# Patient Record
Sex: FEMALE | Race: WHITE | NOT HISPANIC OR LATINO | Employment: FULL TIME | URBAN - METROPOLITAN AREA
[De-identification: names, ages, dates, MRNs, and addresses within clinical notes are randomized per-mention and may not be internally consistent; named-entity substitution may affect disease eponyms.]

---

## 2024-05-17 ENCOUNTER — TRANSCRIBE ORDERS (OUTPATIENT)
Dept: ADMINISTRATIVE | Facility: HOSPITAL | Age: 49
End: 2024-05-17
Payer: COMMERCIAL

## 2024-05-17 DIAGNOSIS — Z12.31 OTHER SCREENING MAMMOGRAM: Primary | ICD-10-CM

## 2024-05-28 ENCOUNTER — OFFICE VISIT (OUTPATIENT)
Dept: OBSTETRICS AND GYNECOLOGY | Facility: CLINIC | Age: 49
End: 2024-05-28
Payer: COMMERCIAL

## 2024-05-28 VITALS
HEIGHT: 68 IN | SYSTOLIC BLOOD PRESSURE: 138 MMHG | DIASTOLIC BLOOD PRESSURE: 80 MMHG | BODY MASS INDEX: 24.1 KG/M2 | WEIGHT: 159 LBS

## 2024-05-28 DIAGNOSIS — N95.1 MENOPAUSAL SYMPTOMS: Primary | ICD-10-CM

## 2024-05-28 DIAGNOSIS — N92.6 IRREGULAR PERIODS: ICD-10-CM

## 2024-05-28 DIAGNOSIS — B97.7 HPV IN FEMALE: ICD-10-CM

## 2024-05-28 DIAGNOSIS — R53.83 FATIGUE, UNSPECIFIED TYPE: ICD-10-CM

## 2024-05-28 DIAGNOSIS — Z11.3 SCREENING FOR STD (SEXUALLY TRANSMITTED DISEASE): ICD-10-CM

## 2024-05-28 PROCEDURE — 99203 OFFICE O/P NEW LOW 30 MIN: CPT | Performed by: NURSE PRACTITIONER

## 2024-05-28 RX ORDER — MELATONIN
1000 DAILY
COMMUNITY
Start: 2023-10-01

## 2024-05-28 RX ORDER — CHLORAL HYDRATE 500 MG
1000 CAPSULE ORAL
COMMUNITY

## 2024-05-28 NOTE — PROGRESS NOTES
"Chief Complaint  Nida Meier is a 48 y.o.  female presenting for Gynecologic Exam (New GYN, establish care.  Patient had pap 5/15/24 elsewhere.  Negative, high risk HPV positive, HR HPV 16 positive. /Patient desires consultation only today. )    History of Present Illness  Nida is a very pleasant 49yo woman, , here for establishing gynecologic care.    Her PSHx includes a  section delivery.  (2nd delivery was a .)  She recently had a pap smear, elsewhere, with negative cytology, but + HR HPV.  HPV typing was done, and it was type 16, positive.  (Negative for 18/45.)  She is willing to be scheduled for colposcopy/ECC/Bx of the cervix.  She has no previous PMHx of abnormal paps or HPV.  She has a Mirena IUD for contraception.    Sexual partners in her lifetime, approx seven (7).  She is a fit / active / nonsmoker.      She is having irregular menstrual cycles in the past year.  And she is experiencing VMS / flushing / night sweats and difficulty sleeping.  She c/o having \"brain fog\" sometimes.  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  And notes some vaginal dryness with sexual activity.    Mammogram is scheduled for next month.  Colonoscopy is scheduled for tomorrow.    The following portions of the patient's history were reviewed and updated as appropriate: allergies, current medications, past family history, past medical history, past social history, past surgical history, and problem list.    Allergies   Allergen Reactions    Sulfa Antibiotics Rash         Current Outpatient Medications:     cholecalciferol (Vitamin D) 25 MCG (1000 UT) tablet, Take 1 tablet by mouth Daily., Disp: , Rfl:     Omega-3 Fatty Acids (fish oil) 1000 MG capsule capsule, Take 1 capsule by mouth Daily With Breakfast., Disp: , Rfl:     History reviewed. No pertinent past medical history.     Past " "Surgical History:   Procedure Laterality Date     SECTION         Objective  /80   Ht 172.7 cm (68\")   Wt 72.1 kg (159 lb)   LMP 05/15/2024 (Exact Date) Comment: Mirena IUD.  Breastfeeding No   BMI 24.18 kg/m²     Physical Exam  Vitals and nursing note reviewed.   Constitutional:       General: She is not in acute distress.     Appearance: Normal appearance. She is normal weight. She is not ill-appearing.   Neck:      Thyroid: No thyroid mass or thyromegaly.   Cardiovascular:      Rate and Rhythm: Normal rate and regular rhythm.      Heart sounds: Normal heart sounds. No murmur heard.  Pulmonary:      Effort: Pulmonary effort is normal. No respiratory distress.      Breath sounds: Normal breath sounds.   Abdominal:      General: Abdomen is flat.      Palpations: There is no mass.      Tenderness: There is no abdominal tenderness.   Skin:     General: Skin is warm and dry.   Neurological:      Mental Status: She is alert and oriented to person, place, and time.   Psychiatric:         Mood and Affect: Mood normal.         Behavior: Behavior normal.         Assessment/Plan   Diagnoses and all orders for this visit:    1. Menopausal symptoms (Primary)  -     Follicle Stimulating Hormone; Future  -     Luteinizing Hormone; Future  -     Estradiol; Future  -     TSH; Future    2. Irregular periods  -     CBC & Differential; Future  -     Follicle Stimulating Hormone; Future  -     Luteinizing Hormone; Future  -     Estradiol; Future  -     TSH; Future    3. HPV in female    4. Screening for STD (sexually transmitted disease)  -     HIV-1 / O / 2 Ag / Antibody; Future  -     T Pallidum Antibody w/ reflex RPR; Future  -     Hepatitis C RNA, Quantitative, PCR (graph); Future  -     Hepatitis C Antibody; Future    5. Fatigue, unspecified type  -     TSH; Future    Couns re: Labs, then will manage the VMS / vaginal dryness.  Couns re: Colpo/Bx/ECC procedure, and will schedule back for this in the next few " weeks.      Procedures    40 to 64: Counseling/Anticipatory Guidance Discussed: sexual behavior and STDs, contraception, screenings, and ABN pap smear / HPV and the Colpo procedure.    Return for Colpo/Bx/ECC.    Rosario Rome, APRN  05/28/2024

## 2024-06-03 ENCOUNTER — TELEPHONE (OUTPATIENT)
Dept: OBSTETRICS AND GYNECOLOGY | Facility: CLINIC | Age: 49
End: 2024-06-03
Payer: COMMERCIAL

## 2024-06-03 NOTE — TELEPHONE ENCOUNTER
Caller: Nida Meier    Relationship: Self    Best call back number: [phone]    What orders are you requesting (i.e. lab or imaging): BLOODWORK / LABS    In what timeframe would the patient need to come in: ASAP    Where will you receive your lab/imaging services:  [facility] ON [address]    Additional notes: FAX [phone]  PT IS THERE NOW TO HAVE LABS DRAWN AND NEEDS TO HAVE THIS ORDER SENT ASAP

## 2024-06-12 ENCOUNTER — HOSPITAL ENCOUNTER (OUTPATIENT)
Dept: MAMMOGRAPHY | Facility: HOSPITAL | Age: 49
Discharge: HOME | End: 2024-06-12
Admitting: OBSTETRICS & GYNECOLOGY
Payer: COMMERCIAL

## 2024-06-12 DIAGNOSIS — Z12.31 OTHER SCREENING MAMMOGRAM: ICD-10-CM

## 2024-06-12 PROCEDURE — 77063 BREAST TOMOSYNTHESIS BI: CPT

## 2024-06-12 PROCEDURE — 77067 SCR MAMMO BI INCL CAD: CPT

## 2024-07-03 ENCOUNTER — OFFICE VISIT (OUTPATIENT)
Dept: OBSTETRICS AND GYNECOLOGY | Facility: CLINIC | Age: 49
End: 2024-07-03
Payer: COMMERCIAL

## 2024-07-03 VITALS
SYSTOLIC BLOOD PRESSURE: 128 MMHG | DIASTOLIC BLOOD PRESSURE: 78 MMHG | WEIGHT: 159 LBS | HEIGHT: 68 IN | BODY MASS INDEX: 24.1 KG/M2

## 2024-07-03 DIAGNOSIS — R87.810 HUMAN PAPILLOMAVIRUS (HPV) TYPE 16 DNA DETECTED IN CERVICAL SPECIMEN: Primary | ICD-10-CM

## 2024-07-03 RX ORDER — DICYCLOMINE HCL 20 MG
20 TABLET ORAL 3 TIMES DAILY PRN
COMMUNITY
Start: 2024-05-30

## 2024-07-03 NOTE — PROGRESS NOTES
"Chief Complaint  Nida Meier is a 49 y.o.  female presenting for Procedure (Colposcopy.  )    History of Present Illness  Nida is a very pleasant 50yo woman, , here for colposcopy of the cervix only.  Her most recent pap smear was done at an outside facility.  The cytology was negative, but + HR HPV.  In particular, + Type 16.  Neg for types 18/45.  She had no PMHx of abnormal paps.    The following portions of the patient's history were reviewed and updated as appropriate: allergies, current medications, past family history, past medical history, past social history, past surgical history, and problem list.    Allergies   Allergen Reactions    Sulfa Antibiotics Rash         Current Outpatient Medications:     dicyclomine (BENTYL) 20 MG tablet, Take 1 tablet by mouth 3 (Three) Times a Day As Needed., Disp: , Rfl:     cholecalciferol (Vitamin D) 25 MCG (1000 UT) tablet, Take 1 tablet by mouth Daily., Disp: , Rfl:     Omega-3 Fatty Acids (fish oil) 1000 MG capsule capsule, Take 1 capsule by mouth Daily With Breakfast., Disp: , Rfl:     Past Medical History:   Diagnosis Date    Abnormal Pap smear of cervix         Past Surgical History:   Procedure Laterality Date     SECTION         Objective  /78   Ht 172.7 cm (68\")   Wt 72.1 kg (159 lb)   LMP 2024 (Exact Date) Comment: Mirena IUD.  Breastfeeding No   BMI 24.18 kg/m²     Physical Exam    Assessment/Plan   Diagnoses and all orders for this visit:    1. Human papillomavirus (HPV) type 16 DNA detected in cervical specimen (Primary)  -     TISSUE EXAM, [facility] ([facility])    Other orders  -     Colposcopy        Colposcopy    Date/Time: 7/3/2024 12:39 PM    Performed by: Rosario Rome APRN  Authorized by: Rosario Rome APRN  Local anesthesia used: no    Anesthesia:  Local anesthesia used: no    Sedation:  Patient sedated: no    Patient tolerance: patient tolerated the procedure well with no immediate " complications  Comments: Couns re: stages of abnormal cells, that varying degrees of cell abnormalities can progress and regress over time, in part due to immune system response.  Couns re: HPV, with types 16, 18/45 being the most worrisome in causing abnormal cells.  We discussed the procedure, including risks, and a consent form was signed.  Time out was observed for right pt & right procedure.  There was no abnormal vaginal discharge.  No leukoplakia or atypical vessels.  I am able to view all 360 degrees of the current SCJ.  ECC taken.  Bx taken at 1:00.  IUD strings are ~2-2.5cm long, and stayed in place for all the procedure.  Impression is normal vs. Atypia.  Advised vaginal precautions x 5 days (no sexual activity and no tampons).  Pt tolerated the procedure well, and left the office in good condition.                  Return for Next scheduled follow up.    Rosario Rome, GISELLE  07/03/2024

## 2024-07-05 ENCOUNTER — TELEPHONE (OUTPATIENT)
Dept: OBSTETRICS AND GYNECOLOGY | Facility: CLINIC | Age: 49
End: 2024-07-05
Payer: COMMERCIAL

## 2024-07-05 LAB — REF LAB TEST METHOD: NORMAL

## 2024-07-08 NOTE — TELEPHONE ENCOUNTER
Caller: Nida Meier    Relationship: Self    Best call back number: [phone]    Who are you requesting to speak with (clinical staff, provider,  specific staff member): CLINICAL     What was the call regarding: PT WOULD LIKE A CALL BACK TO FOLLOW UP FROM HER COLPOSCOPY ON 07/03.     PT STATED SHE DID NOT HAVE BLEEDING ON 07/03, HAD SOME SPOTTING ON 07/04, AND WHEN SHE WOKE UP TODAY SHE IS HAVING RED BLOOD  
"\"This is not uncommon following the colpo/bx.  (The silver nitrate & Monsel's solution keep them from bleeding right away.  But, then they bleed for a few days.)  Please ck on her, and be sure if it has stopped now, or almost stopped. - Sasha Rome, APRN\"    Called and spoke with patient, informed her of Sasha's advisement and she verified understanding.  She states that she has noticed a slowing of the bleeding a bit and it has changed to a more brown coloration.     Patient would like to know what the results were of the biopsy, she states she sees the results have arrived in MyChart, but no result notes are present just yet.  Patient would prefer a call back after 3p as she is a .   "
Called and spoke with patient.  Pt states that she had a colposcopy on 7-3-24 and had no bleeding after the procedure but had some brown spotting yesterday and now c/o bright red bleeding today.  Pt stated no pain, cramping and is not changing a pad every 30 mins or feels like she is soaking a pad.  Pt has an IUD and doesn't really have a regular period.  
I sent msg on the portal (attached to the report) and left her a voice mail msg also.  
[employer].

## 2024-08-06 ENCOUNTER — TELEPHONE (OUTPATIENT)
Dept: OBSTETRICS AND GYNECOLOGY | Facility: CLINIC | Age: 49
End: 2024-08-06
Payer: COMMERCIAL

## 2024-08-06 NOTE — TELEPHONE ENCOUNTER
Called and spoke with patient, she states that she had her Colonoscopy on May 29, 2024. She also states that she believes the reminders are coming from the nine lab orders that are appearing as still pending on her chart, though she already had those completed and they were scanned in under the date 6/3/2024.  She would like to know if these could be removed/canceled as well since they have actually been resulted.

## 2024-08-06 NOTE — TELEPHONE ENCOUNTER
Provider:  GISELLE GREENBERG    Caller:FERNANDO TOWNSENDO    Phone Number:[phone]     Reason for Call:PATIENT IS CALLING WITH CONCERNS BECAUSE SHE KEEPS GETTING MY CHART ALERTS THAT SHE NEEDS TO SCHEDULE FOR A PROCEDURE AND TESTING BUT AS FAR AS SHE IS AWARE EVERYTHING WAS TAKEN CARE OF AT THE BEGINNING OF JULY//PLEASE FOLLOW UP

## 2024-08-06 NOTE — TELEPHONE ENCOUNTER
I sent msg to Lacey to get these off.  (I don't know how to do so.)  I did take the colonoscopy off the care gap.

## 2025-06-30 ENCOUNTER — TRANSCRIBE ORDERS (OUTPATIENT)
Dept: GENERAL RADIOLOGY | Facility: HOSPITAL | Age: 50
End: 2025-06-30
Payer: COMMERCIAL

## 2025-06-30 DIAGNOSIS — R07.1 PAIN AGGRAVATED BY BREATHING: Primary | ICD-10-CM

## 2025-07-31 ENCOUNTER — TRANSCRIBE ORDERS (OUTPATIENT)
Dept: ADMINISTRATIVE | Facility: HOSPITAL | Age: 50
End: 2025-07-31
Payer: COMMERCIAL

## 2025-07-31 DIAGNOSIS — Z12.31 ENCOUNTER FOR SCREENING MAMMOGRAM FOR MALIGNANT NEOPLASM OF BREAST: Primary | ICD-10-CM

## 2025-08-18 LAB
NCCN CRITERIA FLAG: NORMAL
TYRER CUZICK SCORE: 9.4

## 2025-08-23 ENCOUNTER — HOSPITAL ENCOUNTER (OUTPATIENT)
Dept: MAMMOGRAPHY | Facility: HOSPITAL | Age: 50
Discharge: HOME | End: 2025-08-23
Admitting: OBSTETRICS & GYNECOLOGY
Payer: COMMERCIAL

## 2025-08-23 DIAGNOSIS — Z12.31 ENCOUNTER FOR SCREENING MAMMOGRAM FOR MALIGNANT NEOPLASM OF BREAST: ICD-10-CM

## 2025-08-23 PROCEDURE — 77063 BREAST TOMOSYNTHESIS BI: CPT

## 2025-08-23 PROCEDURE — 77067 SCR MAMMO BI INCL CAD: CPT
